# Patient Record
Sex: FEMALE | Race: WHITE | URBAN - METROPOLITAN AREA
[De-identification: names, ages, dates, MRNs, and addresses within clinical notes are randomized per-mention and may not be internally consistent; named-entity substitution may affect disease eponyms.]

---

## 2021-05-05 ENCOUNTER — OFFICE VISIT (OUTPATIENT)
Dept: URBAN - METROPOLITAN AREA CLINIC 54 | Facility: CLINIC | Age: 51
End: 2021-05-05

## 2021-05-26 ENCOUNTER — WEB ENCOUNTER (OUTPATIENT)
Dept: URBAN - METROPOLITAN AREA CLINIC 54 | Facility: CLINIC | Age: 51
End: 2021-05-26

## 2021-05-26 ENCOUNTER — OFFICE VISIT (OUTPATIENT)
Dept: URBAN - METROPOLITAN AREA CLINIC 54 | Facility: CLINIC | Age: 51
End: 2021-05-26
Payer: COMMERCIAL

## 2021-05-26 ENCOUNTER — DASHBOARD ENCOUNTERS (OUTPATIENT)
Age: 51
End: 2021-05-26

## 2021-05-26 DIAGNOSIS — K58.2 IRRITABLE BOWEL SYNDROME WITH BOTH CONSTIPATION AND DIARRHEA: ICD-10-CM

## 2021-05-26 DIAGNOSIS — K21.9 GASTROESOPHAGEAL REFLUX DISEASE, UNSPECIFIED WHETHER ESOPHAGITIS PRESENT: ICD-10-CM

## 2021-05-26 DIAGNOSIS — K92.1 MELENA: ICD-10-CM

## 2021-05-26 DIAGNOSIS — K62.5 RECTAL BLEEDING: ICD-10-CM

## 2021-05-26 DIAGNOSIS — Z12.11 COLON CANCER SCREENING: ICD-10-CM

## 2021-05-26 PROBLEM — 10743008: Status: ACTIVE | Noted: 2021-05-26

## 2021-05-26 PROCEDURE — 99204 OFFICE O/P NEW MOD 45 MIN: CPT | Performed by: INTERNAL MEDICINE

## 2021-05-26 RX ORDER — THYROID, PORCINE 60 MG/1
TAKE 1 TABLET BY ORAL ROUTE TABLET ORAL
Qty: 0 | Refills: 0 | Status: ACTIVE | COMMUNITY
Start: 1900-01-01

## 2021-05-26 RX ORDER — FLUOXETINE HCL 20 MG
TAKE 1 CAPSULE (20 MG) BY ORAL ROUTE ONCE DAILY IN THE EVENING CAPSULE ORAL 1
Qty: 0 | Refills: 0 | Status: ON HOLD | COMMUNITY
Start: 1900-01-01

## 2021-05-26 RX ORDER — LEVOTHYROXINE SODIUM 50 UG/1
TAKE 1 TABLET (50 MCG) BY ORAL ROUTE ONCE DAILY TABLET ORAL 1
Qty: 0 | Refills: 0 | Status: ACTIVE | COMMUNITY
Start: 1900-01-01

## 2021-05-26 RX ORDER — TAMOXIFEN CITRATE 10 MG/1
1 TABLET TABLET ORAL TWICE A DAY
Status: ON HOLD | COMMUNITY

## 2021-05-26 RX ORDER — SODIUM, POTASSIUM,MAG SULFATES 17.5-3.13G
354 ML SOLUTION, RECONSTITUTED, ORAL ORAL
Qty: 354 ML | Refills: 0 | OUTPATIENT
Start: 2021-05-26 | End: 2021-05-27

## 2021-05-26 NOTE — HPI-TODAY'S VISIT:
50-year-old lady with a history of breast cancer in remission after bilateral mastectomy and chemotherapy who presents to GI clinic with IBS M and GERD. Patient Benjy finished her chemotherapy in 2020 and since then her bowel movements have changed from once a day of formed stool to alternating bowel habits of 1 stool per every 2 days to 3 or 4 stools every day.  She is also experiencing intermittent rectal bleeding which she describes blood on the toilet paper.  She has a sensation of bloating and abdominal distention. Patient also has a history of GERD for more than 25 years which she describes as daytime and nighttime heartburn.  Appears well controlled on Prilosec but she does have breakthrough occasionally requires Pepcid.  Of note she also reports having seen black tarry stool couple times. She denies any NSAIDs.  She denies any prior EGD or colonoscopy.

## 2021-05-28 LAB
ENDOMYSIAL ANTIBODY IGA: NEGATIVE
FERRITIN, SERUM: 21
HEMATOCRIT: 39.4
HEMOGLOBIN: 12.9
IMMUNOGLOBULIN A, QN, SERUM: 119
IRON BIND.CAP.(TIBC): 439
IRON SATURATION: 12
IRON: 53
MCH: 28.2
MCHC: 32.7
MCV: 86
NRBC: (no result)
PLATELETS: 242
RBC: 4.58
RDW: 13.7
T-TRANSGLUTAMINASE (TTG) IGA: <2
UIBC: 386
WBC: 5.6

## 2021-06-02 ENCOUNTER — OFFICE VISIT (OUTPATIENT)
Dept: URBAN - METROPOLITAN AREA SURGERY CENTER 14 | Facility: SURGERY CENTER | Age: 51
End: 2021-06-02
Payer: COMMERCIAL

## 2021-06-02 DIAGNOSIS — Z12.11 COLON CANCER SCREENING: ICD-10-CM

## 2021-06-02 DIAGNOSIS — K29.60 ADENOPAPILLOMATOSIS GASTRICA: ICD-10-CM

## 2021-06-02 DIAGNOSIS — D50.9 ANEMIA, IRON DEFICIENCY: ICD-10-CM

## 2021-06-02 DIAGNOSIS — K31.89 ACQUIRED DEFORMITY OF DUODENUM: ICD-10-CM

## 2021-06-02 DIAGNOSIS — D12.0 ADENOMA OF CECUM: ICD-10-CM

## 2021-06-02 PROCEDURE — G8907 PT DOC NO EVENTS ON DISCHARG: HCPCS | Performed by: INTERNAL MEDICINE

## 2021-06-02 PROCEDURE — 45385 COLONOSCOPY W/LESION REMOVAL: CPT | Performed by: INTERNAL MEDICINE

## 2021-06-02 PROCEDURE — 43239 EGD BIOPSY SINGLE/MULTIPLE: CPT | Performed by: INTERNAL MEDICINE

## 2021-06-02 RX ORDER — LEVOTHYROXINE SODIUM 50 UG/1
TAKE 1 TABLET (50 MCG) BY ORAL ROUTE ONCE DAILY TABLET ORAL 1
Qty: 0 | Refills: 0 | Status: ACTIVE | COMMUNITY
Start: 1900-01-01

## 2021-06-02 RX ORDER — FLUOXETINE HCL 20 MG
TAKE 1 CAPSULE (20 MG) BY ORAL ROUTE ONCE DAILY IN THE EVENING CAPSULE ORAL 1
Qty: 0 | Refills: 0 | Status: ON HOLD | COMMUNITY
Start: 1900-01-01

## 2021-06-02 RX ORDER — TAMOXIFEN CITRATE 10 MG/1
1 TABLET TABLET ORAL TWICE A DAY
Status: ON HOLD | COMMUNITY

## 2021-06-02 RX ORDER — THYROID, PORCINE 60 MG/1
TAKE 1 TABLET BY ORAL ROUTE TABLET ORAL
Qty: 0 | Refills: 0 | Status: ACTIVE | COMMUNITY
Start: 1900-01-01